# Patient Record
Sex: FEMALE | Race: WHITE | Employment: UNEMPLOYED | URBAN - METROPOLITAN AREA
[De-identification: names, ages, dates, MRNs, and addresses within clinical notes are randomized per-mention and may not be internally consistent; named-entity substitution may affect disease eponyms.]

---

## 2020-05-20 ENCOUNTER — HOSPITAL ENCOUNTER (EMERGENCY)
Age: 35
Discharge: LEFT BEFORE DISCHARGE | End: 2020-05-20
Attending: EMERGENCY MEDICINE

## 2020-05-21 NOTE — ED NOTES
Pt left lobby without saying anything to registrar. Car observed leaving parking lot.    Pt left before coming back to room, being triaged by RN or being examined by Anderson Alejandra RN  05/20/20 [id number]

## 2020-05-21 NOTE — ED NOTES
lwot @ [id number]. Patient didn't say anything to registrar about leaving, but car was observed leaving lot.